# Patient Record
Sex: FEMALE | Race: WHITE | HISPANIC OR LATINO | Employment: STUDENT | URBAN - METROPOLITAN AREA
[De-identification: names, ages, dates, MRNs, and addresses within clinical notes are randomized per-mention and may not be internally consistent; named-entity substitution may affect disease eponyms.]

---

## 2019-03-09 ENCOUNTER — HOSPITAL ENCOUNTER (EMERGENCY)
Facility: HOSPITAL | Age: 24
Discharge: HOME | End: 2019-03-09
Attending: EMERGENCY MEDICINE
Payer: MEDICAID

## 2019-03-09 VITALS
RESPIRATION RATE: 18 BRPM | BODY MASS INDEX: 25.61 KG/M2 | DIASTOLIC BLOOD PRESSURE: 81 MMHG | HEIGHT: 68 IN | WEIGHT: 169 LBS | SYSTOLIC BLOOD PRESSURE: 127 MMHG | TEMPERATURE: 99 F | OXYGEN SATURATION: 100 % | HEART RATE: 79 BPM

## 2019-03-09 DIAGNOSIS — L30.9 DERMATITIS: Primary | ICD-10-CM

## 2019-03-09 PROCEDURE — 99284 EMERGENCY DEPT VISIT MOD MDM: CPT

## 2019-03-09 RX ORDER — HYDROCORTISONE 1 %
CREAM (GRAM) TOPICAL
Qty: 30 G | Refills: 1 | Status: SHIPPED | OUTPATIENT
Start: 2019-03-09

## 2019-03-09 RX ORDER — DIPHENHYDRAMINE HCL 25 MG
25 CAPSULE ORAL EVERY 6 HOURS PRN
Qty: 20 CAPSULE | Refills: 0 | Status: SHIPPED | OUTPATIENT
Start: 2019-03-09

## 2019-03-09 NOTE — ED PROVIDER NOTES
Encounter Date: 3/9/2019       History     Chief Complaint   Patient presents with    Rash     pt began having scattered red bumps to face and body last night. Pt states she just switched soaps from Dove to Aveeno yesterday.     22 y/o female with no reported PMH presents for a rash to upper arms, face and stomach x1 day.  Pt changed soaps yesterday, also states was sick with fever yesterday.  Pt afebrile, no complaints of URI sx, N/V/D or body aches.  Pt states has seen a dermatologist for tinea recently, but is no longer using that medication and wants a 2nd opinion for new bumps that she has now.  Pt states that she does take a shot for skin bumps when she visits her [place] ([facility] [place] once a year.  She has not visited recently.  No other positive contacts with rash in house.      The history is provided by the patient. A  was used.     Review of patient's allergies indicates:  No Known Allergies  History reviewed. No pertinent past medical history.  History reviewed. No pertinent surgical history.  No family history on file.  Social History     Tobacco Use    Smoking status: Never Smoker   Substance Use Topics    Alcohol use: No     Frequency: Never    Drug use: Not on file     Review of Systems   Constitutional: Positive for appetite change and fever.   HENT: Negative for congestion, rhinorrhea and sore throat.    Gastrointestinal: Negative for diarrhea, nausea and vomiting.   Musculoskeletal: Negative for myalgias.   All other systems reviewed and are negative.      Physical Exam     Initial Vitals [03/09/19 1117]   BP Pulse Resp Temp SpO2   132/85 86 18 98.7 °F (37.1 °C) 100 %      MAP       --         Physical Exam    Nursing note and vitals reviewed.  Constitutional: Vital signs are normal. She appears well-developed and well-nourished. She is cooperative.  Non-toxic appearance. She does not appear ill. No distress.   HENT:   Head: Atraumatic.   Nose: Nose normal.   Eyes:  Conjunctivae and EOM are normal.   Neck: Neck supple.   Cardiovascular: Normal rate and regular rhythm.   Pulses:       Dorsalis pedis pulses are 2+ on the right side, and 2+ on the left side.   Pulmonary/Chest: Effort normal.   Abdominal: Normal appearance. There is no tenderness.   Musculoskeletal: Normal range of motion.   Lymphadenopathy:     She has no cervical adenopathy.   Neurological: She is alert and oriented to person, place, and time. She has normal strength. No cranial nerve deficit or sensory deficit.   Skin: Skin is warm and intact. Capillary refill takes less than 2 seconds. Rash noted. Rash is papular.        Psychiatric: She has a normal mood and affect. Her speech is normal and behavior is normal.         ED Course   Procedures  Labs Reviewed - No data to display       Imaging Results    None          Medical Decision Making:   Initial Assessment:   Pt presents for a rash to upper arms, face and stomach x 1 day.  Pt changed soaps yesterday, also states was sick with fever yesterday.      Pt has scant pruritic papular like bumps on her bilateral inner arms, RUQ, one under left eye and right-sided face.  Has seen Derm recently for tinea, not using those meds, wants 2nd opinion.  Does not have any rash that appears to be tinea in origin.  No rash in between digits or on hands or feet.  Pt did recently change soaps.  Differential Diagnosis:   Contact dermatitis, allergic reaction to unknown substance, folliculitis  ED Management:  Rash likely due to reaction of hygiene products or unknown substance.  Pt to use hydrocortisone cream on bumps, Benadryl p.r.n. for itching.  Dermatology number given and follow-up with PCP.  Pt to return to ER for any concerns, a worsening or change in current condition. Pt verbalized understanding of all d/c instructions.     Other:   I have discussed this case with another health care provider.              Attending Attestation:     Physician Attestation Statement for  NP/PA:   I reviewed the chart but I did not personally examine the patient. The face to face encounter was performed by the NP/PA.                     Clinical Impression:       ICD-10-CM ICD-9-CM   1. Dermatitis L30.9 692.9                                Rosalino Haskins NP  03/09/19 1227       Leida Whitt MD  03/09/19 1545

## 2019-03-09 NOTE — ED NOTES
APPEARANCE: Alert, oriented and in no acute distress.  CARDIAC: Normal rate and rhythm, no murmur heard.   PERIPHERAL VASCULAR: peripheral pulses present. Normal cap refill. No edema. Warm to touch.    RESPIRATORY:Normal rate and effort, breath sounds clear bilaterally throughout chest. Respirations are equal and unlabored no obvious signs of distress.  GASTRO: soft, bowel sounds normal, no tenderness, no abdominal distention.  MUSC: Full ROM. No bony tenderness or soft tissue tenderness. No obvious deformity.  SKIN: Skin is warm and dry, normal skin turgor, mucous membranes moist. RED RASH THROUGHOUT BODY  NEURO: 5/5 strength major flexors/extensors bilaterally. Sensory intact to light touch bilaterally. Amy coma scale: eyes open spontaneously-4, oriented & converses-5, obeys commands-6. No neurological abnormalities.   MENTAL STATUS: awake, alert and aware of environment.  EYE: PERRL, both eyes: pupils brisk and reactive to light. Normal size.  ENT: EARS: no obvious drainage. NOSE: no active bleeding.

## 2019-03-09 NOTE — ED NOTES
Patient presents to ED with c/o Rash all over her body. Patient states she noticed the rashes on yesterday. Patient recently changed her soap from Dove to Aveeno.

## 2019-03-09 NOTE — DISCHARGE INSTRUCTIONS
Miriam de usar el nuevo jabón que probaste steven. Use la pomada prescrita en los golpes dos veces al día. Puede hermes Benadryl para la picazón según sea necesario. Primitivo jesika smita con keller médico de atención primaria o con los dermatólogos si los bultos continúan.

## 2020-05-28 ENCOUNTER — OFFICE VISIT (OUTPATIENT)
Dept: URGENT CARE | Facility: CLINIC | Age: 25
End: 2020-05-28
Payer: MEDICAID

## 2020-05-28 VITALS
BODY MASS INDEX: 22.22 KG/M2 | RESPIRATION RATE: 20 BRPM | OXYGEN SATURATION: 98 % | WEIGHT: 150 LBS | TEMPERATURE: 99 F | HEIGHT: 69 IN | HEART RATE: 100 BPM

## 2020-05-28 DIAGNOSIS — J02.9 ACUTE PHARYNGITIS, UNSPECIFIED ETIOLOGY: Primary | ICD-10-CM

## 2020-05-28 DIAGNOSIS — Z20.822 CLOSE EXPOSURE TO COVID-19 VIRUS: ICD-10-CM

## 2020-05-28 LAB
CTP QC/QA: YES
MOLECULAR STREP A: NEGATIVE

## 2020-05-28 PROCEDURE — U0003 INFECTIOUS AGENT DETECTION BY NUCLEIC ACID (DNA OR RNA); SEVERE ACUTE RESPIRATORY SYNDROME CORONAVIRUS 2 (SARS-COV-2) (CORONAVIRUS DISEASE [COVID-19]), AMPLIFIED PROBE TECHNIQUE, MAKING USE OF HIGH THROUGHPUT TECHNOLOGIES AS DESCRIBED BY CMS-2020-01-R: HCPCS

## 2020-05-28 PROCEDURE — 87651 STREP A DNA AMP PROBE: CPT | Mod: QW,S$GLB,, | Performed by: EMERGENCY MEDICINE

## 2020-05-28 PROCEDURE — 99203 PR OFFICE/OUTPT VISIT, NEW, LEVL III, 30-44 MIN: ICD-10-PCS | Mod: S$GLB,,, | Performed by: EMERGENCY MEDICINE

## 2020-05-28 PROCEDURE — 87651 POCT STREP A MOLECULAR: ICD-10-PCS | Mod: QW,S$GLB,, | Performed by: EMERGENCY MEDICINE

## 2020-05-28 PROCEDURE — 99203 OFFICE O/P NEW LOW 30 MIN: CPT | Mod: S$GLB,,, | Performed by: EMERGENCY MEDICINE

## 2020-05-28 NOTE — PATIENT INSTRUCTIONS
Nik Belle MD  Go to the Emergency Department for any problems  Call your PCP for follow up next available.    Cuidados personales para el dolor de garganta    El dolor de garganta aparece por muchas razones, por ejemplo, resfriados, alergias e infecciones causadas por virus o bacterias. En cualquier dee, la garganta se enrojece y duele. El objetivo del cuidado personal es reducir las molestias y permitir que la garganta se cure.  Mantenga húmeda keller garganta y alivie keller dolor  · Pruebe bebiendo un sorbo de agua apenas se despierte.  · Mantenga keller garganta húmeda bebiendo seis o más vasos de líquidos naomi por día.  · Utilice un humidificador de aire frío en keller dormitorio ngozi la noche.  · Evite el humo de cigarrillo.  · Chupe pastillas para la garganta o la tos, dulces duros, trozos de hielo o barras de jugo de frutas helado. Consuma las versiones sin azúcar si keller dieta o alguna afección así lo requiere.  Hágase gárgaras para aliviar la irritación  Hacer gárgaras cada hora o dos horas puede aliviar la irritación. Pruebe con alguna de estas soluciones:  · 1/4 de cucharadita de sal en 1/2 taza de agua tibia  · Un gargarismo anestésico de venta sin receta  Use medicamentos para mayor alivio  Los medicamentos sin receta pueden reducir los síntomas de irritación de garganta. Pregunte a keller farmacéutico si tiene dudas acerca del tipo de medicamento que debe usar:  · Alivie el dolor con rociadores anestésicos. La aspirina o un sustituto también puede ser útil. Recuerde no wei nunca aspirina a alguien que tenga menos de 19 años ni si la persona ya está tomando un medicamento anticoagulante.   · Para el dolor causado por alergias, pruebe hermes medicamentos antihistamínicos para bloquear la reacción alérgica.  · Recuerde: a menos que el dolor de garganta sea causado por jesika infección bacteriana, los antibióticos no le ayudarán.  Prevenga el dolor de garganta  · Deje de fumar o reduzca el contacto con el humo de  segunda mano. El humo irrita el delicado revestimiento de la garganta.  · Limite el contacto con mascotas y sustancias que causan alergias, bonnie el polen y el moho.  · Cuando esté cerca de jesika persona que tenga dolor de garganta o esté resfriada, lave chauncey bianca con frecuencia para evitar la propagación de virus o bacterias.  · No esfuerce chauncey cuerdas vocales.  Llame a keller proveedor de atención médica  Llame a keller médico si tiene:  · Fiebre de más de 101°F (38.3°C)  · Puntos blancos en la garganta  · Gran dificultad para tragar  · Dificultad para respirar  · Erupción  · Exposición reciente a jesika persona infectada con la bacteria estreptococo.  · Ronquera excesiva e inflamación de los ganglios en el adam o en la mandíbula   Date Last Reviewed: 2/4/2014  © 9802-7935 [employer]. [address] [place] [id number]. Todos los derechos reservados. Esta información no pretende sustituir la atención médica profesional. Sólo keller médico puede diagnosticar y tratar un problema de christian.      Guía para la prevención general del COVID-19    o [place] medidas para protegerse del COVID-19. Lávese las bianca con frecuencia. Lávese las bianca frecuentemente con agua y jabón ngozi al menos 20 zheng o utilice un desinfectante de bianca con base de alcohol, cubriendo toda la superficie de chauncey bianca y frotándolas juntas hasta que se sequen.  o Evite tocarse los ojos, la nariz y la boca antes de haberse lavado las bianca.  o Evite el contacto cercano con la gente y quédese en casa si está enfermo, excepto para recibir cuidados médicos.   o Tápese la boca al toser o estornudar con un pañuelo, o utilice el interior de keller codo. Inmediatamente después lávese las bianca o utilice un desinfectante de bianca.    Para más información, isidro el enlace del CDC a continuación:   https://www.cdc.gov/coronavirus/2019-ncov/hcp/guidance-prevent-spread-sp.html

## 2020-05-28 NOTE — PROGRESS NOTES
"Subjective:       Patient ID: Nicki Sims is a 24 y.o. female.    Vitals:  height is 5' 9" (1.753 m) and weight is 68 kg (150 lb). Her oral temperature is 98.9 °F (37.2 °C). Her pulse is 100. Her respiration is 20 and oxygen saturation is 98%.     Chief Complaint: COVID-19 Concerns (Exposed to covid - sore thr)    Patient's step mother tested positive for covid-19 today. Patient stated that she started to have a sore throat yesterday, denies fever/ear ache/cough, is not pregnant, NOC.    Sore Throat    This is a new problem. The current episode started yesterday. The problem has been unchanged. Neither side of throat is experiencing more pain than the other. There has been no fever. The pain is at a severity of 3/10. The pain is mild. Pertinent negatives include no congestion, coughing, ear pain, shortness of breath, stridor or vomiting. She has tried nothing for the symptoms.       Constitution: Negative for chills, sweating, fatigue and fever.   HENT: Positive for sore throat. Negative for ear pain, congestion, sinus pain, sinus pressure and voice change.    Neck: Negative for painful lymph nodes.   Eyes: Negative for eye redness.   Respiratory: Negative for chest tightness, cough, sputum production, bloody sputum, COPD, shortness of breath, stridor, wheezing and asthma.    Gastrointestinal: Negative for nausea and vomiting.   Musculoskeletal: Negative for muscle ache.   Skin: Negative for rash.   Allergic/Immunologic: Negative for seasonal allergies and asthma.   Hematologic/Lymphatic: Negative for swollen lymph nodes.       Objective:      Physical Exam   Constitutional: She is oriented to person, place, and time. She appears well-developed and well-nourished.   HENT:   Head: Normocephalic and atraumatic.   Right Ear: Tympanic membrane, external ear and ear canal normal.   Left Ear: Tympanic membrane, external ear and ear canal normal.   Nose: Nose normal. Right sinus exhibits no maxillary sinus " tenderness and no frontal sinus tenderness. Left sinus exhibits no maxillary sinus tenderness and no frontal sinus tenderness.   Mouth/Throat: Uvula is midline and mucous membranes are normal. Posterior oropharyngeal erythema present. No oropharyngeal exudate or posterior oropharyngeal edema.   Neck: Normal range of motion. Neck supple.   Tender bilat anterior cervical LN to palp   Cardiovascular: Normal rate, regular rhythm and normal heart sounds.   Pulmonary/Chest: Breath sounds normal.   Musculoskeletal: Normal range of motion.   Neurological: She is alert and oriented to person, place, and time.   Skin: Skin is warm and dry.   Psychiatric: She has a normal mood and affect. Her behavior is normal.         Assessment:       1. Acute pharyngitis, unspecified etiology    2. Close Exposure to Covid-19 Virus        Plan:         Acute pharyngitis, unspecified etiology  -     POCT Strep A, Molecular    Close Exposure to Covid-19 Virus  -     COVID-19 Routine Screening         Nik Belle MD  Go to the Emergency Department for any problems  Call your PCP for follow up next available.

## 2020-05-29 ENCOUNTER — TELEPHONE (OUTPATIENT)
Dept: URGENT CARE | Facility: CLINIC | Age: 25
End: 2020-05-29

## 2020-05-29 DIAGNOSIS — U07.1 COVID-19 VIRUS DETECTED: ICD-10-CM

## 2020-05-29 LAB — SARS-COV-2 RNA RESP QL NAA+PROBE: DETECTED

## 2020-05-29 NOTE — TELEPHONE ENCOUNTER
Your test was POSITIVE for COVID-19 (coronavirus).     Instructions for Home Care of Patients and Caretakers with Coronavirus Disease 2019     Limit visitors to the home.  Older persons and those that have chronic medical conditions such as diabetes, lung and heart disease are at increased risk for illness.    If possible, patients should use a separate bedroom while recovering. Caregivers and household members should avoid prolonged contact with the patient which means to stay 6 feet away and avoid contact with cough droplets.  When close contact is necessary, wash your hands before and immediately after contact.    Perform hand hygiene frequently. Wash your hands often with soap and water for at least 20 seconds or use an alcohol-based hand , covering all surfaces of your hands and rubbing them together until they feel dry.    Avoid touching your eyes, nose, and mouth with unwashed hands.   Avoid sharing household items with the patient. You should not share dishes, drinking glasses, cups, eating utensils, towels, bedding, or other items. After the patient uses these items, you should wash them thoroughly.   Wash laundry thoroughly.   o Immediately remove and wash clothes or bedding that have blood, stool, or body fluids on them.   Clean all high-touch surfaces, such as counters, tabletops, doorknobs, bathroom fixtures, toilets, phones, keyboards, tablets, and bedside tables, every day.   o Use a household cleaning spray or wipe, according to the label instructions. Labels contain instructions for safe and effective use of the cleaning product including precautions you should take when applying the product, such as wearing gloves and making sure you have good ventilation during use of the product.    For more information see CDC link below.      https://www.cdc.gov/coronavirus/2019-ncov/hcp/guidance-prevent-spread.html#precautions               If your symptoms worsen or if you have any other  concerns, please contact [facility] at [phone].    Discussed positive results with patient.  All patient's questions answered she verbalized understanding to our discussion

## 2020-06-26 ENCOUNTER — LAB VISIT (OUTPATIENT)
Dept: PRIMARY CARE CLINIC | Facility: OTHER | Age: 25
End: 2020-06-26
Payer: MEDICAID

## 2020-06-26 DIAGNOSIS — Z03.818 ENCOUNTER FOR OBSERVATION FOR SUSPECTED EXPOSURE TO OTHER BIOLOGICAL AGENTS RULED OUT: ICD-10-CM

## 2020-06-26 PROCEDURE — U0003 INFECTIOUS AGENT DETECTION BY NUCLEIC ACID (DNA OR RNA); SEVERE ACUTE RESPIRATORY SYNDROME CORONAVIRUS 2 (SARS-COV-2) (CORONAVIRUS DISEASE [COVID-19]), AMPLIFIED PROBE TECHNIQUE, MAKING USE OF HIGH THROUGHPUT TECHNOLOGIES AS DESCRIBED BY CMS-2020-01-R: HCPCS

## 2020-06-30 LAB — SARS-COV-2 RNA RESP QL NAA+PROBE: NEGATIVE

## 2021-04-16 ENCOUNTER — PATIENT MESSAGE (OUTPATIENT)
Dept: RESEARCH | Facility: HOSPITAL | Age: 26
End: 2021-04-16

## 2021-08-03 ENCOUNTER — LAB VISIT (OUTPATIENT)
Dept: FAMILY MEDICINE | Facility: CLINIC | Age: 26
End: 2021-08-03
Payer: MEDICAID

## 2021-08-03 DIAGNOSIS — R51.9 HEAD ACHE: ICD-10-CM

## 2021-08-03 PROCEDURE — U0005 INFEC AGEN DETEC AMPLI PROBE: HCPCS | Performed by: INTERNAL MEDICINE

## 2021-08-03 PROCEDURE — U0003 INFECTIOUS AGENT DETECTION BY NUCLEIC ACID (DNA OR RNA); SEVERE ACUTE RESPIRATORY SYNDROME CORONAVIRUS 2 (SARS-COV-2) (CORONAVIRUS DISEASE [COVID-19]), AMPLIFIED PROBE TECHNIQUE, MAKING USE OF HIGH THROUGHPUT TECHNOLOGIES AS DESCRIBED BY CMS-2020-01-R: HCPCS | Performed by: INTERNAL MEDICINE

## 2021-08-04 LAB
SARS-COV-2 RNA RESP QL NAA+PROBE: NOT DETECTED
SARS-COV-2- CYCLE NUMBER: -1

## 2021-11-22 ENCOUNTER — OFFICE VISIT (OUTPATIENT)
Dept: URGENT CARE | Facility: CLINIC | Age: 26
End: 2021-11-22
Payer: MEDICAID

## 2021-11-22 VITALS
HEART RATE: 80 BPM | OXYGEN SATURATION: 97 % | TEMPERATURE: 98 F | SYSTOLIC BLOOD PRESSURE: 110 MMHG | BODY MASS INDEX: 25.9 KG/M2 | RESPIRATION RATE: 16 BRPM | WEIGHT: 165 LBS | DIASTOLIC BLOOD PRESSURE: 72 MMHG | HEIGHT: 67 IN

## 2021-11-22 DIAGNOSIS — H00.021 HORDEOLUM INTERNUM OF RIGHT UPPER EYELID: Primary | ICD-10-CM

## 2021-11-22 PROCEDURE — 99203 PR OFFICE/OUTPT VISIT, NEW, LEVL III, 30-44 MIN: ICD-10-PCS | Mod: S$GLB,,, | Performed by: NURSE PRACTITIONER

## 2021-11-22 PROCEDURE — 99203 OFFICE O/P NEW LOW 30 MIN: CPT | Mod: S$GLB,,, | Performed by: NURSE PRACTITIONER

## 2021-11-22 RX ORDER — POLYMYXIN B SULFATE AND TRIMETHOPRIM 1; 10000 MG/ML; [USP'U]/ML
1 SOLUTION OPHTHALMIC EVERY 6 HOURS
Qty: 10 ML | Refills: 0 | Status: SHIPPED | OUTPATIENT
Start: 2021-11-22

## 2021-12-29 ENCOUNTER — LAB VISIT (OUTPATIENT)
Dept: PRIMARY CARE CLINIC | Facility: OTHER | Age: 26
End: 2021-12-29
Attending: INTERNAL MEDICINE
Payer: MEDICAID

## 2021-12-29 DIAGNOSIS — Z20.822 ENCOUNTER FOR LABORATORY TESTING FOR COVID-19 VIRUS: ICD-10-CM

## 2021-12-29 PROCEDURE — U0003 INFECTIOUS AGENT DETECTION BY NUCLEIC ACID (DNA OR RNA); SEVERE ACUTE RESPIRATORY SYNDROME CORONAVIRUS 2 (SARS-COV-2) (CORONAVIRUS DISEASE [COVID-19]), AMPLIFIED PROBE TECHNIQUE, MAKING USE OF HIGH THROUGHPUT TECHNOLOGIES AS DESCRIBED BY CMS-2020-01-R: HCPCS | Performed by: INTERNAL MEDICINE

## 2021-12-30 ENCOUNTER — PATIENT MESSAGE (OUTPATIENT)
Dept: ADMINISTRATIVE | Facility: OTHER | Age: 26
End: 2021-12-30
Payer: MEDICAID

## 2021-12-31 LAB
SARS-COV-2 RNA RESP QL NAA+PROBE: DETECTED
SARS-COV-2- CYCLE NUMBER: 38

## 2025-05-08 ENCOUNTER — HOSPITAL ENCOUNTER (EMERGENCY)
Facility: HOSPITAL | Age: 30
Discharge: HOME | End: 2025-05-09
Attending: STUDENT IN AN ORGANIZED HEALTH CARE EDUCATION/TRAINING PROGRAM

## 2025-05-08 DIAGNOSIS — J02.0 STREP PHARYNGITIS: Primary | ICD-10-CM

## 2025-05-08 PROCEDURE — 99284 EMERGENCY DEPT VISIT MOD MDM: CPT

## 2025-05-08 NOTE — Clinical Note
"Nicki Hallnegar Gan was seen and treated in our emergency department on 5/8/2025.  She may return to work on 05/12/2025.       If you have any questions or concerns, please don't hesitate to call.      Gabriel Allen MD"

## 2025-05-09 VITALS
WEIGHT: 165 LBS | SYSTOLIC BLOOD PRESSURE: 136 MMHG | DIASTOLIC BLOOD PRESSURE: 76 MMHG | TEMPERATURE: 101 F | HEIGHT: 69 IN | OXYGEN SATURATION: 99 % | RESPIRATION RATE: 18 BRPM | HEART RATE: 115 BPM | BODY MASS INDEX: 24.44 KG/M2

## 2025-05-09 LAB
B-HCG UR QL: NEGATIVE
BACTERIA #/AREA URNS AUTO: ABNORMAL /HPF
BILIRUB UR QL STRIP.AUTO: NEGATIVE
CLARITY UR: CLEAR
COLOR UR AUTO: YELLOW
CTP QC/QA: YES
GLUCOSE UR QL STRIP: NEGATIVE
GROUP A STREP MOLECULAR (OHS): POSITIVE
HGB UR QL STRIP: ABNORMAL
HOLD SPECIMEN: NORMAL
KETONES UR QL STRIP: ABNORMAL
LEUKOCYTE ESTERASE UR QL STRIP: ABNORMAL
MICROSCOPIC COMMENT: ABNORMAL
NITRITE UR QL STRIP: NEGATIVE
PH UR STRIP: 7 [PH]
POC MOLECULAR INFLUENZA A AGN: NEGATIVE
POC MOLECULAR INFLUENZA B AGN: NEGATIVE
PROT UR QL STRIP: NEGATIVE
RBC #/AREA URNS AUTO: 2 /HPF (ref 0–4)
SARS-COV-2 RDRP RESP QL NAA+PROBE: NEGATIVE
SP GR UR STRIP: 1.01
SQUAMOUS #/AREA URNS AUTO: 4 /HPF
UROBILINOGEN UR STRIP-ACNC: NEGATIVE EU/DL
WBC #/AREA URNS AUTO: 12 /HPF (ref 0–5)

## 2025-05-09 PROCEDURE — 87086 URINE CULTURE/COLONY COUNT: CPT | Performed by: STUDENT IN AN ORGANIZED HEALTH CARE EDUCATION/TRAINING PROGRAM

## 2025-05-09 PROCEDURE — 81003 URINALYSIS AUTO W/O SCOPE: CPT | Performed by: STUDENT IN AN ORGANIZED HEALTH CARE EDUCATION/TRAINING PROGRAM

## 2025-05-09 PROCEDURE — 63600175 PHARM REV CODE 636 W HCPCS: Performed by: STUDENT IN AN ORGANIZED HEALTH CARE EDUCATION/TRAINING PROGRAM

## 2025-05-09 PROCEDURE — 87502 INFLUENZA DNA AMP PROBE: CPT

## 2025-05-09 PROCEDURE — 25000003 PHARM REV CODE 250: Performed by: STUDENT IN AN ORGANIZED HEALTH CARE EDUCATION/TRAINING PROGRAM

## 2025-05-09 PROCEDURE — 96372 THER/PROPH/DIAG INJ SC/IM: CPT | Performed by: STUDENT IN AN ORGANIZED HEALTH CARE EDUCATION/TRAINING PROGRAM

## 2025-05-09 PROCEDURE — 87635 SARS-COV-2 COVID-19 AMP PRB: CPT | Performed by: STUDENT IN AN ORGANIZED HEALTH CARE EDUCATION/TRAINING PROGRAM

## 2025-05-09 PROCEDURE — 81025 URINE PREGNANCY TEST: CPT | Performed by: STUDENT IN AN ORGANIZED HEALTH CARE EDUCATION/TRAINING PROGRAM

## 2025-05-09 PROCEDURE — 87651 STREP A DNA AMP PROBE: CPT | Performed by: STUDENT IN AN ORGANIZED HEALTH CARE EDUCATION/TRAINING PROGRAM

## 2025-05-09 RX ORDER — AMOXICILLIN 250 MG/1
1000 CAPSULE ORAL
Status: COMPLETED | OUTPATIENT
Start: 2025-05-09 | End: 2025-05-09

## 2025-05-09 RX ORDER — AMOXICILLIN 500 MG/1
500 CAPSULE ORAL EVERY 12 HOURS
Qty: 20 CAPSULE | Refills: 0 | Status: SHIPPED | OUTPATIENT
Start: 2025-05-09 | End: 2025-05-19

## 2025-05-09 RX ORDER — DROPERIDOL 2.5 MG/ML
1.25 INJECTION, SOLUTION INTRAMUSCULAR; INTRAVENOUS ONCE
Status: COMPLETED | OUTPATIENT
Start: 2025-05-09 | End: 2025-05-09

## 2025-05-09 RX ORDER — ACETAMINOPHEN 500 MG
1000 TABLET ORAL
Status: COMPLETED | OUTPATIENT
Start: 2025-05-09 | End: 2025-05-09

## 2025-05-09 RX ORDER — IBUPROFEN 600 MG/1
600 TABLET, FILM COATED ORAL
Status: COMPLETED | OUTPATIENT
Start: 2025-05-09 | End: 2025-05-09

## 2025-05-09 RX ADMIN — ACETAMINOPHEN 1000 MG: 500 TABLET ORAL at 12:05

## 2025-05-09 RX ADMIN — DROPERIDOL 1.25 MG: 2.5 INJECTION, SOLUTION INTRAMUSCULAR; INTRAVENOUS at 01:05

## 2025-05-09 RX ADMIN — AMOXICILLIN 1000 MG: 250 CAPSULE ORAL at 01:05

## 2025-05-09 RX ADMIN — IBUPROFEN 600 MG: 600 TABLET, FILM COATED ORAL at 02:05

## 2025-05-09 NOTE — ED NOTES
Patient presents to ED with CC of N/V/D, fever, body aches, and sore throat. Patient reports this symptoms started today. Patient denies any PMH or taking daily medications. Patient AAOX4, respirations even and unlabored, and in no acute distress at this time. Patient notified of need or urine sample. Patient reports she will use call bell to notify staff of need of urine sample. Bed locked and in lowest position, call bell within reach. Patient denies any needs at this time.

## 2025-05-09 NOTE — ED PROVIDER NOTES
ED Provider Note - 5/8/2025    History     Chief Complaint   Patient presents with    General Illness     Patient reports sore throat, fever, headache, diarrhea, n/v that started today. Ibuprofen taken at 7pm       HPI     Nicki Gan is a 29 y.o. year old female with past medical and surgical history as seen below, presenting with chief complaint of sore throat.  Associated with intermittent fever and bilateral/frontal headache.  Also with intermittent nausea, vomiting, diarrhea.  Ibuprofen taken at home several hours ago without much effect on symptoms.  Denying neck pain or stiffness.  No altered mental status.        History reviewed. No pertinent past medical history.  History reviewed. No pertinent surgical history.      Family History   Problem Relation Name Age of Onset    No Known Problems Mother      No Known Problems Father      Diabetes Maternal Aunt       Social History[1]  Social Drivers of Health with Concerns     Alcohol Use: Unknown (6/18/2024)    Received from [facility]    AUDIT-C     Frequency of Alcohol Consumption: Monthly or less     Average Number of Drinks: 1 or 2     Frequency of Binge Drinking: Patient declined   Food Insecurity: Food Insecurity Present (6/18/2024)    Received from [facility]    Hunger Vital Sign     Worried About Running Out of Food in the Last Year: Sometimes true     Ran Out of Food in the Last Year: Never true   Physical Activity: Insufficiently Active (6/18/2024)    Received from [facility]    Exercise Vital Sign     Days of Exercise per Week: 3 days     Minutes of Exercise per Session: 40 min   Housing Stability: High Risk (6/18/2024)    Received from [facility]    Housing Stability Vital Sign     Unable to Pay for Housing in the Last Year: No     Number of Places Lived in the Last Year: 1     Unstable Housing in the Last Year: Yes   Health Literacy: Not on file   Social Isolation: Not on file      Review of patient's allergies indicates:  No  Known Allergies    Review of Systems     A full Review of Systems (ROS) was performed and was negative unless otherwise stated in the HPI.      Physical Exam     Vitals:    05/09/25 0248 05/09/25 0249 05/09/25 0252 05/09/25 0253   BP:   136/76    Pulse:  107  (!) 115   Resp:    18   Temp: (!) 100.5 °F (38.1 °C)      TempSrc: Oral      SpO2:  98% 99% 99%   Weight:       Height:            Physical Exam    Nursing note and vitals reviewed.  Constitutional: She appears well-developed and well-nourished. No distress.   HENT:   Head: Normocephalic and atraumatic.   Right Ear: External ear normal.   Left Ear: External ear normal.   Nose: Nose normal. Mouth/Throat: Uvula is midline and mucous membranes are normal. No trismus in the jaw. No uvula swelling. Posterior oropharyngeal erythema present. No posterior oropharyngeal edema or tonsillar abscesses.   Eyes: Conjunctivae and EOM are normal. Pupils are equal, round, and reactive to light.   Neck: Neck supple.   Normal range of motion.  Cardiovascular:  Normal rate, regular rhythm, normal heart sounds and intact distal pulses.           Pulmonary/Chest: Breath sounds normal. No stridor. No respiratory distress. She has no wheezes. She has no rhonchi. She has no rales.   Abdominal: Abdomen is soft. Bowel sounds are normal. There is no abdominal tenderness.   Musculoskeletal:         General: No tenderness or edema. Normal range of motion.      Cervical back: Normal range of motion and neck supple.     Neurological: She is alert and oriented to person, place, and time. She has normal strength. No cranial nerve deficit or sensory deficit.   Skin: Skin is warm and dry. No rash noted.   Psychiatric: She has a normal mood and affect. Thought content normal.         Lab Results- Independently reviewed by myself      Labs Reviewed   GROUP A STREP, MOLECULAR - Abnormal       Result Value    Group A Strep Molecular Positive (*)     Narrative:     Arcanobacterium haemolyticum and  Beta Streptococcus group C and G will not be detected by this test method.  Please order Throat Culture ([id number]) if suspected.       URINALYSIS, REFLEX TO URINE CULTURE - Abnormal    Color, UA Yellow      Appearance, UA Clear      pH, UA 7.0      Spec Grav UA 1.010      Protein, UA Negative      Glucose, UA Negative      Ketones, UA 1+ (*)     Bilirubin, UA Negative      Blood, UA 2+ (*)     Nitrites, UA Negative      Urobilinogen, UA Negative      Leukocyte Esterase, UA 3+ (*)    URINALYSIS MICROSCOPIC - Abnormal    RBC, UA 2      WBC, UA 12 (*)     Bacteria, UA Rare      Squamous Epithelial Cells, UA 4      Microscopic Comment       CULTURE, URINE   GREY TOP URINE HOLD    Extra Tube Hold for add-ons.     SARS-COV-2 RDRP GENE    POC Rapid COVID Negative       Acceptable Yes     POCT INFLUENZA A/B MOLECULAR    POC Molecular Influenza A Ag Negative      POC Molecular Influenza B Ag Negative       Acceptable Yes     POCT URINE PREGNANCY    POC Preg Test, Ur Negative       Acceptable Yes             Imaging     Imaging Results    None                    ED Course         Procedures         Orders Placed This Encounter    Group A Strep, Molecular    Urine culture    Urinalysis, Reflex to Urine Culture Urine, Clean Catch    GREY TOP URINE HOLD    Urinalysis Microscopic    POCT COVID-19 Rapid Screening    POCT Influenza A/B Molecular    POCT urine pregnancy    acetaminophen tablet 1,000 mg    droPERidol injection 1.25 mg    amoxicillin capsule 1,000 mg    amoxicillin (AMOXIL) 500 MG capsule    ibuprofen tablet 600 mg          ED Course as of 05/09/25 0531   Fri May 09, 2025   0034 Group A Strep, Molecular(!): Positive [KB]   0100 SARS-CoV-2 RNA, Amplification, Qual: Negative [KB]   0100 POC Molecular Influenza A Ag: Negative [KB]   0100 POC Molecular Influenza B Ag: Negative [KB]   0114 hCG Qualitative, Urine: Negative [KB]   0222 Urinalysis Microscopic(!)  Rare bacteria and  negative nitrite without specific urinary tract symptoms.  We will withhold treatment pending culture.  Patient will be started on amoxicillin for strep throat otherwise. [KB]      ED Course User Index  [KB] Gabriel Allen MD              Medical Decision Making       The patient's list of active medical problems, social history, medications, and allergies as documented per RN staff has been reviewed.           Medical Decision Making  This is an emergent workup of a patient with cc of sore throat. Patient has no personal history of immunocompromise. Nontoxic appearance. Patient euvolemic with no trismus. No airway compromise. No change in voice, exudates, enlarged lymph nodes. Able to tolerate PO. Given History and Exam most likely explanation of symptoms is strep pharyngitis. I have low suspicion for this presentation being caused by PTA, RPA, Ludwigs angina, Epiglottitis or Bacterial Tracheitis, or EBV. Close follow-up with PCP advised. Patient to return to the ED for reevaluation should symptoms worsen, return, or change in character.        Amount and/or Complexity of Data Reviewed  Labs: ordered. Decision-making details documented in ED Course.    Risk  OTC drugs.  Prescription drug management.                    ED Prescriptions       Medication Sig Dispense Start Date End Date Auth. Provider    amoxicillin (AMOXIL) 500 MG capsule Take 1 capsule (500 mg total) by mouth every 12 (twelve) hours. for 10 days 20 capsule 5/9/2025 5/19/2025 Gabriel Allen MD              Clinical Impression       Follow-up Information       Follow up With Specialties Details Why Contact Info    Primary care provider of your choice  Schedule an appointment as soon as possible for a visit in 3 days For follow-up on today's visit.     [facility] Emergency Dept Emergency Medicine Go to  As needed, If symptoms worsen [address] [facility] [id number] [phone]            Referrals:  No orders of the defined types  were placed in this encounter.      Disposition   ED Disposition Condition    Discharge Stable              Final diagnoses:  [J02.0] Strep pharyngitis (Primary)        Gabriel Allen MD        05/09/2025          DISCLAIMER: This note was prepared with [employer] voice recognition transcription software. Garbled syntax, mangled pronouns, and other bizarre constructions may be attributed to that software system.         [1]   Social History  Tobacco Use    Smoking status: Never    Smokeless tobacco: Never   Substance Use Topics    Alcohol use: No        Gabriel Allen MD  05/09/25 [id number]

## 2025-05-09 NOTE — DISCHARGE INSTRUCTIONS
Alternate between tylenol 500-650 mg and ibuprofen 400-600 mg every 4 hours.  Both of these medicines will help with fever, chills, headache, and body aches.      Use cough syrup, lozenges, or honey as needed for cough or sore throat.

## 2025-05-10 LAB — BACTERIA UR CULT: NORMAL
